# Patient Record
Sex: MALE | Race: AMERICAN INDIAN OR ALASKA NATIVE | NOT HISPANIC OR LATINO | Employment: UNEMPLOYED | URBAN - METROPOLITAN AREA
[De-identification: names, ages, dates, MRNs, and addresses within clinical notes are randomized per-mention and may not be internally consistent; named-entity substitution may affect disease eponyms.]

---

## 2024-08-29 ENCOUNTER — HOSPITAL ENCOUNTER (OUTPATIENT)
Facility: HOSPITAL | Age: 19
Setting detail: OBSERVATION
Discharge: HOME | End: 2024-08-30
Attending: EMERGENCY MEDICINE | Admitting: EMERGENCY MEDICINE

## 2024-08-29 DIAGNOSIS — R00.2 PALPITATIONS: ICD-10-CM

## 2024-08-29 DIAGNOSIS — R07.9 CHEST PAIN IN ADULT: Primary | ICD-10-CM

## 2024-08-29 PROCEDURE — 93005 ELECTROCARDIOGRAM TRACING: CPT | Performed by: EMERGENCY MEDICINE

## 2024-08-29 PROCEDURE — 93005 ELECTROCARDIOGRAM TRACING: CPT

## 2024-08-29 PROCEDURE — 99285 EMERGENCY DEPT VISIT HI MDM: CPT

## 2024-08-30 ENCOUNTER — APPOINTMENT (OUTPATIENT)
Dept: CARDIOLOGY | Facility: HOSPITAL | Age: 19
End: 2024-08-30

## 2024-08-30 ENCOUNTER — APPOINTMENT (OUTPATIENT)
Dept: GENERAL RADIOLOGY | Facility: HOSPITAL | Age: 19
End: 2024-08-30

## 2024-08-30 VITALS
BODY MASS INDEX: 24.64 KG/M2 | WEIGHT: 176 LBS | HEIGHT: 71 IN | HEART RATE: 47 BPM | OXYGEN SATURATION: 99 % | SYSTOLIC BLOOD PRESSURE: 129 MMHG | DIASTOLIC BLOOD PRESSURE: 80 MMHG | RESPIRATION RATE: 16 BRPM | TEMPERATURE: 97.8 F

## 2024-08-30 PROBLEM — R00.2 PALPITATIONS: Status: ACTIVE | Noted: 2024-08-30

## 2024-08-30 LAB
ALBUMIN SERPL-MCNC: 4.6 G/DL (ref 3.5–5.2)
ALBUMIN/GLOB SERPL: 1.7 G/DL
ALP SERPL-CCNC: 76 U/L (ref 39–117)
ALT SERPL W P-5'-P-CCNC: 13 U/L (ref 1–41)
AMPHET+METHAMPHET UR QL: NEGATIVE
ANION GAP SERPL CALCULATED.3IONS-SCNC: 11.8 MMOL/L (ref 5–15)
ANION GAP SERPL CALCULATED.3IONS-SCNC: 7.9 MMOL/L (ref 5–15)
AORTIC ARCH: 2.6 CM
APTT PPP: 29.7 SECONDS (ref 22.7–35.4)
ASCENDING AORTA: 2.6 CM
AST SERPL-CCNC: 20 U/L (ref 1–40)
BARBITURATES UR QL SCN: NEGATIVE
BASOPHILS # BLD AUTO: 0.02 10*3/MM3 (ref 0–0.2)
BASOPHILS NFR BLD AUTO: 0.2 % (ref 0–1.5)
BENZODIAZ UR QL SCN: NEGATIVE
BH CV ECHO MEAS - ACS: 2.25 CM
BH CV ECHO MEAS - AO MAX PG: 5.3 MMHG
BH CV ECHO MEAS - AO MEAN PG: 2.6 MMHG
BH CV ECHO MEAS - AO ROOT DIAM: 2.9 CM
BH CV ECHO MEAS - AO V2 MAX: 114.7 CM/SEC
BH CV ECHO MEAS - AO V2 VTI: 23.2 CM
BH CV ECHO MEAS - AVA(I,D): 2.9 CM2
BH CV ECHO MEAS - EDV(CUBED): 126.5 ML
BH CV ECHO MEAS - EDV(MOD-SP2): 123 ML
BH CV ECHO MEAS - EDV(MOD-SP4): 100 ML
BH CV ECHO MEAS - EF(MOD-BP): 52.9 %
BH CV ECHO MEAS - EF(MOD-SP2): 56.1 %
BH CV ECHO MEAS - EF(MOD-SP4): 51 %
BH CV ECHO MEAS - ESV(CUBED): 46.5 ML
BH CV ECHO MEAS - ESV(MOD-SP2): 54 ML
BH CV ECHO MEAS - ESV(MOD-SP4): 49 ML
BH CV ECHO MEAS - FS: 28.4 %
BH CV ECHO MEAS - IVS/LVPW: 1.05 CM
BH CV ECHO MEAS - IVSD: 0.65 CM
BH CV ECHO MEAS - LAT PEAK E' VEL: 20.9 CM/SEC
BH CV ECHO MEAS - LV DIASTOLIC VOL/BSA (35-75): 50.1 CM2
BH CV ECHO MEAS - LV MASS(C)D: 102.7 GRAMS
BH CV ECHO MEAS - LV MAX PG: 3.3 MMHG
BH CV ECHO MEAS - LV MEAN PG: 1.53 MMHG
BH CV ECHO MEAS - LV SYSTOLIC VOL/BSA (12-30): 24.5 CM2
BH CV ECHO MEAS - LV V1 MAX: 90.7 CM/SEC
BH CV ECHO MEAS - LV V1 VTI: 18.6 CM
BH CV ECHO MEAS - LVIDD: 5 CM
BH CV ECHO MEAS - LVIDS: 3.6 CM
BH CV ECHO MEAS - LVOT AREA: 3.6 CM2
BH CV ECHO MEAS - LVOT DIAM: 2.15 CM
BH CV ECHO MEAS - LVPWD: 0.62 CM
BH CV ECHO MEAS - MED PEAK E' VEL: 13.2 CM/SEC
BH CV ECHO MEAS - MV A DUR: 0.14 SEC
BH CV ECHO MEAS - MV A MAX VEL: 40.5 CM/SEC
BH CV ECHO MEAS - MV DEC SLOPE: 373.3 CM/SEC2
BH CV ECHO MEAS - MV DEC TIME: 0.19 SEC
BH CV ECHO MEAS - MV E MAX VEL: 81.5 CM/SEC
BH CV ECHO MEAS - MV E/A: 2.01
BH CV ECHO MEAS - MV MAX PG: 4 MMHG
BH CV ECHO MEAS - MV MEAN PG: 0.73 MMHG
BH CV ECHO MEAS - MV P1/2T: 73 MSEC
BH CV ECHO MEAS - MV V2 VTI: 34.1 CM
BH CV ECHO MEAS - MVA(P1/2T): 3 CM2
BH CV ECHO MEAS - MVA(VTI): 1.97 CM2
BH CV ECHO MEAS - PA ACC TIME: 0.15 SEC
BH CV ECHO MEAS - PA V2 MAX: 78.8 CM/SEC
BH CV ECHO MEAS - PULM A REVS DUR: 0.13 SEC
BH CV ECHO MEAS - PULM A REVS VEL: 34.1 CM/SEC
BH CV ECHO MEAS - PULM DIAS VEL: 37.9 CM/SEC
BH CV ECHO MEAS - PULM S/D: 1.06
BH CV ECHO MEAS - PULM SYS VEL: 40.3 CM/SEC
BH CV ECHO MEAS - QP/QS: 0.83
BH CV ECHO MEAS - RAP SYSTOLE: 3 MMHG
BH CV ECHO MEAS - RV MAX PG: 1.35 MMHG
BH CV ECHO MEAS - RV V1 MAX: 58.2 CM/SEC
BH CV ECHO MEAS - RV V1 VTI: 15.8 CM
BH CV ECHO MEAS - RVOT DIAM: 2.12 CM
BH CV ECHO MEAS - RVSP: 22 MMHG
BH CV ECHO MEAS - SV(LVOT): 67.3 ML
BH CV ECHO MEAS - SV(MOD-SP2): 69 ML
BH CV ECHO MEAS - SV(MOD-SP4): 51 ML
BH CV ECHO MEAS - SV(RVOT): 55.7 ML
BH CV ECHO MEAS - SVI(LVOT): 33.7 ML/M2
BH CV ECHO MEAS - SVI(MOD-SP2): 34.5 ML/M2
BH CV ECHO MEAS - SVI(MOD-SP4): 25.5 ML/M2
BH CV ECHO MEAS - TAPSE (>1.6): 2.35 CM
BH CV ECHO MEAS - TR MAX PG: 19.4 MMHG
BH CV ECHO MEAS - TR MAX VEL: 220 CM/SEC
BH CV ECHO MEASUREMENTS AVERAGE E/E' RATIO: 4.78
BH CV STRESS BP STAGE 1: NORMAL
BH CV STRESS BP STAGE 2: NORMAL
BH CV STRESS BP STAGE 3: NORMAL
BH CV STRESS BP STAGE 4: NORMAL
BH CV STRESS DURATION MIN STAGE 1: 3
BH CV STRESS DURATION MIN STAGE 2: 3
BH CV STRESS DURATION MIN STAGE 3: 3
BH CV STRESS DURATION MIN STAGE 4: 3
BH CV STRESS DURATION SEC STAGE 1: 0
BH CV STRESS DURATION SEC STAGE 2: 0
BH CV STRESS DURATION SEC STAGE 3: 0
BH CV STRESS DURATION SEC STAGE 4: 0
BH CV STRESS GRADE STAGE 1: 10
BH CV STRESS GRADE STAGE 2: 12
BH CV STRESS GRADE STAGE 3: 14
BH CV STRESS GRADE STAGE 4: 16
BH CV STRESS HR STAGE 1: 110
BH CV STRESS HR STAGE 2: 126
BH CV STRESS HR STAGE 3: 150
BH CV STRESS HR STAGE 4: 174
BH CV STRESS METS STAGE 1: 5
BH CV STRESS METS STAGE 2: 7.5
BH CV STRESS METS STAGE 3: 10
BH CV STRESS METS STAGE 4: 13.5
BH CV STRESS PROTOCOL 1: NORMAL
BH CV STRESS RECOVERY BP: NORMAL MMHG
BH CV STRESS RECOVERY HR: 97 BPM
BH CV STRESS SPEED STAGE 1: 1.7
BH CV STRESS SPEED STAGE 2: 2.5
BH CV STRESS SPEED STAGE 3: 3.4
BH CV STRESS SPEED STAGE 4: 4.2
BH CV STRESS STAGE 1: 1
BH CV STRESS STAGE 2: 2
BH CV STRESS STAGE 3: 3
BH CV STRESS STAGE 4: 4
BH CV XLRA - RV BASE: 3.7 CM
BH CV XLRA - RV LENGTH: 8.3 CM
BH CV XLRA - RV MID: 2.8 CM
BH CV XLRA - TDI S': 12.8 CM/SEC
BILIRUB SERPL-MCNC: 0.5 MG/DL (ref 0–1.2)
BUN SERPL-MCNC: 15 MG/DL (ref 6–20)
BUN SERPL-MCNC: 17 MG/DL (ref 6–20)
BUN/CREAT SERPL: 13.4 (ref 7–25)
BUN/CREAT SERPL: 13.9 (ref 7–25)
CALCIUM SPEC-SCNC: 8.5 MG/DL (ref 8.6–10.5)
CALCIUM SPEC-SCNC: 9.3 MG/DL (ref 8.6–10.5)
CANNABINOIDS SERPL QL: NEGATIVE
CHLORIDE SERPL-SCNC: 106 MMOL/L (ref 98–107)
CHLORIDE SERPL-SCNC: 109 MMOL/L (ref 98–107)
CK SERPL-CCNC: 215 U/L (ref 20–200)
CO2 SERPL-SCNC: 23.1 MMOL/L (ref 22–29)
CO2 SERPL-SCNC: 23.2 MMOL/L (ref 22–29)
COCAINE UR QL: NEGATIVE
CREAT SERPL-MCNC: 1.08 MG/DL (ref 0.76–1.27)
CREAT SERPL-MCNC: 1.27 MG/DL (ref 0.76–1.27)
D DIMER PPP FEU-MCNC: <0.27 MCGFEU/ML (ref 0–0.5)
DEPRECATED RDW RBC AUTO: 41.4 FL (ref 37–54)
DEPRECATED RDW RBC AUTO: 43 FL (ref 37–54)
EGFRCR SERPLBLD CKD-EPI 2021: 100.8 ML/MIN/1.73
EGFRCR SERPLBLD CKD-EPI 2021: 82.9 ML/MIN/1.73
EOSINOPHIL # BLD AUTO: 0.02 10*3/MM3 (ref 0–0.4)
EOSINOPHIL NFR BLD AUTO: 0.2 % (ref 0.3–6.2)
ERYTHROCYTE [DISTWIDTH] IN BLOOD BY AUTOMATED COUNT: 12.2 % (ref 12.3–15.4)
ERYTHROCYTE [DISTWIDTH] IN BLOOD BY AUTOMATED COUNT: 12.6 % (ref 12.3–15.4)
ETHANOL BLD-MCNC: <10 MG/DL (ref 0–10)
ETHANOL UR QL: <0.01 %
FENTANYL UR-MCNC: NEGATIVE NG/ML
GEN 5 2HR TROPONIN T REFLEX: 15 NG/L
GLOBULIN UR ELPH-MCNC: 2.7 GM/DL
GLUCOSE SERPL-MCNC: 111 MG/DL (ref 65–99)
GLUCOSE SERPL-MCNC: 90 MG/DL (ref 65–99)
HCT VFR BLD AUTO: 39.5 % (ref 37.5–51)
HCT VFR BLD AUTO: 41.9 % (ref 37.5–51)
HGB BLD-MCNC: 13.5 G/DL (ref 13–17.7)
HGB BLD-MCNC: 14.2 G/DL (ref 13–17.7)
IMM GRANULOCYTES # BLD AUTO: 0.03 10*3/MM3 (ref 0–0.05)
IMM GRANULOCYTES NFR BLD AUTO: 0.3 % (ref 0–0.5)
INR PPP: 1.17 (ref 0.9–1.1)
LEFT ATRIUM VOLUME INDEX: 21 ML/M2
LYMPHOCYTES # BLD AUTO: 2 10*3/MM3 (ref 0.7–3.1)
LYMPHOCYTES # BLD MANUAL: 2.33 10*3/MM3 (ref 0.7–3.1)
LYMPHOCYTES NFR BLD AUTO: 22.5 % (ref 19.6–45.3)
LYMPHOCYTES NFR BLD MANUAL: 9 % (ref 5–12)
MAGNESIUM SERPL-MCNC: 2.3 MG/DL (ref 1.7–2.2)
MAXIMAL PREDICTED HEART RATE: 200 BPM
MCH RBC QN AUTO: 31.5 PG (ref 26.6–33)
MCH RBC QN AUTO: 32.2 PG (ref 26.6–33)
MCHC RBC AUTO-ENTMCNC: 33.9 G/DL (ref 31.5–35.7)
MCHC RBC AUTO-ENTMCNC: 34.2 G/DL (ref 31.5–35.7)
MCV RBC AUTO: 92.9 FL (ref 79–97)
MCV RBC AUTO: 94.3 FL (ref 79–97)
METHADONE UR QL SCN: NEGATIVE
MONOCYTES # BLD AUTO: 0.63 10*3/MM3 (ref 0.1–0.9)
MONOCYTES # BLD: 0.49 10*3/MM3 (ref 0.1–0.9)
MONOCYTES NFR BLD AUTO: 7.1 % (ref 5–12)
NEUTROPHILS # BLD AUTO: 2.6 10*3/MM3 (ref 1.7–7)
NEUTROPHILS NFR BLD AUTO: 6.19 10*3/MM3 (ref 1.7–7)
NEUTROPHILS NFR BLD AUTO: 69.7 % (ref 42.7–76)
NEUTROPHILS NFR BLD MANUAL: 48 % (ref 42.7–76)
NRBC BLD AUTO-RTO: 0 /100 WBC (ref 0–0.2)
OPIATES UR QL: NEGATIVE
OXYCODONE UR QL SCN: NEGATIVE
PERCENT MAX PREDICTED HR: 87 %
PLAT MORPH BLD: NORMAL
PLATELET # BLD AUTO: 173 10*3/MM3 (ref 140–450)
PLATELET # BLD AUTO: 224 10*3/MM3 (ref 140–450)
PMV BLD AUTO: 10.2 FL (ref 6–12)
PMV BLD AUTO: 10.2 FL (ref 6–12)
POTASSIUM SERPL-SCNC: 3.2 MMOL/L (ref 3.5–5.2)
POTASSIUM SERPL-SCNC: 3.5 MMOL/L (ref 3.5–5.2)
PROT SERPL-MCNC: 7.3 G/DL (ref 6–8.5)
PROTHROMBIN TIME: 15.1 SECONDS (ref 11.7–14.2)
QT INTERVAL: 374 MS
QT INTERVAL: 452 MS
QTC INTERVAL: 443 MS
QTC INTERVAL: 445 MS
RBC # BLD AUTO: 4.19 10*6/MM3 (ref 4.14–5.8)
RBC # BLD AUTO: 4.51 10*6/MM3 (ref 4.14–5.8)
RBC MORPH BLD: NORMAL
SINUS: 2.7 CM
SODIUM SERPL-SCNC: 140 MMOL/L (ref 136–145)
SODIUM SERPL-SCNC: 141 MMOL/L (ref 136–145)
STJ: 2.7 CM
STRESS BASELINE BP: NORMAL MMHG
STRESS BASELINE HR: 97 BPM
STRESS PERCENT HR: 102 %
STRESS POST ESTIMATED WORKLOAD: 13.5 METS
STRESS POST EXERCISE DUR MIN: 12 MIN
STRESS POST EXERCISE DUR SEC: 0 SEC
STRESS POST PEAK BP: NORMAL MMHG
STRESS POST PEAK HR: 174 BPM
STRESS TARGET HR: 170 BPM
TROPONIN T DELTA: 2 NG/L
TROPONIN T SERPL HS-MCNC: 13 NG/L
TROPONIN T SERPL HS-MCNC: 14 NG/L
TSH SERPL DL<=0.05 MIU/L-ACNC: 1.96 UIU/ML (ref 0.27–4.2)
VARIANT LYMPHS NFR BLD MANUAL: 43 % (ref 19.6–45.3)
WBC MORPH BLD: NORMAL
WBC NRBC COR # BLD AUTO: 5.42 10*3/MM3 (ref 3.4–10.8)
WBC NRBC COR # BLD AUTO: 8.89 10*3/MM3 (ref 3.4–10.8)

## 2024-08-30 PROCEDURE — 96374 THER/PROPH/DIAG INJ IV PUSH: CPT

## 2024-08-30 PROCEDURE — 80053 COMPREHEN METABOLIC PANEL: CPT | Performed by: EMERGENCY MEDICINE

## 2024-08-30 PROCEDURE — G0378 HOSPITAL OBSERVATION PER HR: HCPCS

## 2024-08-30 PROCEDURE — 93016 CV STRESS TEST SUPVJ ONLY: CPT | Performed by: STUDENT IN AN ORGANIZED HEALTH CARE EDUCATION/TRAINING PROGRAM

## 2024-08-30 PROCEDURE — 80307 DRUG TEST PRSMV CHEM ANLYZR: CPT | Performed by: EMERGENCY MEDICINE

## 2024-08-30 PROCEDURE — 84484 ASSAY OF TROPONIN QUANT: CPT | Performed by: EMERGENCY MEDICINE

## 2024-08-30 PROCEDURE — 93005 ELECTROCARDIOGRAM TRACING: CPT | Performed by: PHYSICIAN ASSISTANT

## 2024-08-30 PROCEDURE — 93017 CV STRESS TEST TRACING ONLY: CPT

## 2024-08-30 PROCEDURE — 93018 CV STRESS TEST I&R ONLY: CPT | Performed by: STUDENT IN AN ORGANIZED HEALTH CARE EDUCATION/TRAINING PROGRAM

## 2024-08-30 PROCEDURE — 93010 ELECTROCARDIOGRAM REPORT: CPT | Performed by: INTERNAL MEDICINE

## 2024-08-30 PROCEDURE — 82077 ASSAY SPEC XCP UR&BREATH IA: CPT | Performed by: PHYSICIAN ASSISTANT

## 2024-08-30 PROCEDURE — 85730 THROMBOPLASTIN TIME PARTIAL: CPT | Performed by: EMERGENCY MEDICINE

## 2024-08-30 PROCEDURE — 85025 COMPLETE CBC W/AUTO DIFF WBC: CPT | Performed by: EMERGENCY MEDICINE

## 2024-08-30 PROCEDURE — 85027 COMPLETE CBC AUTOMATED: CPT | Performed by: PHYSICIAN ASSISTANT

## 2024-08-30 PROCEDURE — 99204 OFFICE O/P NEW MOD 45 MIN: CPT | Performed by: INTERNAL MEDICINE

## 2024-08-30 PROCEDURE — 82550 ASSAY OF CK (CPK): CPT | Performed by: EMERGENCY MEDICINE

## 2024-08-30 PROCEDURE — 83735 ASSAY OF MAGNESIUM: CPT | Performed by: EMERGENCY MEDICINE

## 2024-08-30 PROCEDURE — 25810000003 SODIUM CHLORIDE 0.9 % SOLUTION: Performed by: EMERGENCY MEDICINE

## 2024-08-30 PROCEDURE — 85379 FIBRIN DEGRADATION QUANT: CPT | Performed by: EMERGENCY MEDICINE

## 2024-08-30 PROCEDURE — 85610 PROTHROMBIN TIME: CPT | Performed by: EMERGENCY MEDICINE

## 2024-08-30 PROCEDURE — 96375 TX/PRO/DX INJ NEW DRUG ADDON: CPT

## 2024-08-30 PROCEDURE — 25010000002 KETOROLAC TROMETHAMINE PER 15 MG: Performed by: EMERGENCY MEDICINE

## 2024-08-30 PROCEDURE — 71045 X-RAY EXAM CHEST 1 VIEW: CPT

## 2024-08-30 PROCEDURE — 84443 ASSAY THYROID STIM HORMONE: CPT | Performed by: EMERGENCY MEDICINE

## 2024-08-30 PROCEDURE — 84484 ASSAY OF TROPONIN QUANT: CPT | Performed by: PHYSICIAN ASSISTANT

## 2024-08-30 PROCEDURE — 93306 TTE W/DOPPLER COMPLETE: CPT

## 2024-08-30 PROCEDURE — 93246 EXT ECG>7D<15D RECORDING: CPT

## 2024-08-30 PROCEDURE — 93306 TTE W/DOPPLER COMPLETE: CPT | Performed by: INTERNAL MEDICINE

## 2024-08-30 PROCEDURE — 36415 COLL VENOUS BLD VENIPUNCTURE: CPT

## 2024-08-30 RX ORDER — BISACODYL 10 MG
10 SUPPOSITORY, RECTAL RECTAL DAILY PRN
Status: DISCONTINUED | OUTPATIENT
Start: 2024-08-30 | End: 2024-08-30 | Stop reason: HOSPADM

## 2024-08-30 RX ORDER — ONDANSETRON 4 MG/1
4 TABLET, ORALLY DISINTEGRATING ORAL EVERY 6 HOURS PRN
Status: DISCONTINUED | OUTPATIENT
Start: 2024-08-30 | End: 2024-08-30 | Stop reason: HOSPADM

## 2024-08-30 RX ORDER — SODIUM CHLORIDE 0.9 % (FLUSH) 0.9 %
10 SYRINGE (ML) INJECTION AS NEEDED
Status: DISCONTINUED | OUTPATIENT
Start: 2024-08-30 | End: 2024-08-30 | Stop reason: HOSPADM

## 2024-08-30 RX ORDER — ASPIRIN 81 MG/1
324 TABLET, CHEWABLE ORAL ONCE
Status: COMPLETED | OUTPATIENT
Start: 2024-08-30 | End: 2024-08-30

## 2024-08-30 RX ORDER — POTASSIUM CHLORIDE 750 MG/1
40 TABLET, FILM COATED, EXTENDED RELEASE ORAL EVERY 4 HOURS
Status: DISCONTINUED | OUTPATIENT
Start: 2024-08-30 | End: 2024-08-30 | Stop reason: HOSPADM

## 2024-08-30 RX ORDER — KETOROLAC TROMETHAMINE 15 MG/ML
15 INJECTION, SOLUTION INTRAMUSCULAR; INTRAVENOUS ONCE
Status: COMPLETED | OUTPATIENT
Start: 2024-08-30 | End: 2024-08-30

## 2024-08-30 RX ORDER — NITROGLYCERIN 0.4 MG/1
0.4 TABLET SUBLINGUAL
Status: DISCONTINUED | OUTPATIENT
Start: 2024-08-30 | End: 2024-08-30 | Stop reason: HOSPADM

## 2024-08-30 RX ORDER — ALUMINA, MAGNESIA, AND SIMETHICONE 2400; 2400; 240 MG/30ML; MG/30ML; MG/30ML
30 SUSPENSION ORAL ONCE
Status: COMPLETED | OUTPATIENT
Start: 2024-08-30 | End: 2024-08-30

## 2024-08-30 RX ORDER — BISACODYL 5 MG/1
5 TABLET, DELAYED RELEASE ORAL DAILY PRN
Status: DISCONTINUED | OUTPATIENT
Start: 2024-08-30 | End: 2024-08-30 | Stop reason: HOSPADM

## 2024-08-30 RX ORDER — LIDOCAINE HYDROCHLORIDE 20 MG/ML
15 SOLUTION OROPHARYNGEAL ONCE
Status: COMPLETED | OUTPATIENT
Start: 2024-08-30 | End: 2024-08-30

## 2024-08-30 RX ORDER — POLYETHYLENE GLYCOL 3350 17 G/17G
17 POWDER, FOR SOLUTION ORAL DAILY PRN
Status: DISCONTINUED | OUTPATIENT
Start: 2024-08-30 | End: 2024-08-30 | Stop reason: HOSPADM

## 2024-08-30 RX ORDER — SODIUM CHLORIDE 9 MG/ML
40 INJECTION, SOLUTION INTRAVENOUS AS NEEDED
Status: DISCONTINUED | OUTPATIENT
Start: 2024-08-30 | End: 2024-08-30 | Stop reason: HOSPADM

## 2024-08-30 RX ORDER — AMOXICILLIN 250 MG
2 CAPSULE ORAL 2 TIMES DAILY PRN
Status: DISCONTINUED | OUTPATIENT
Start: 2024-08-30 | End: 2024-08-30 | Stop reason: HOSPADM

## 2024-08-30 RX ORDER — SODIUM CHLORIDE 9 MG/ML
125 INJECTION, SOLUTION INTRAVENOUS CONTINUOUS
Status: DISCONTINUED | OUTPATIENT
Start: 2024-08-30 | End: 2024-08-30 | Stop reason: HOSPADM

## 2024-08-30 RX ORDER — FAMOTIDINE 10 MG/ML
20 INJECTION, SOLUTION INTRAVENOUS ONCE
Status: COMPLETED | OUTPATIENT
Start: 2024-08-30 | End: 2024-08-30

## 2024-08-30 RX ORDER — SODIUM CHLORIDE 0.9 % (FLUSH) 0.9 %
10 SYRINGE (ML) INJECTION EVERY 12 HOURS SCHEDULED
Status: DISCONTINUED | OUTPATIENT
Start: 2024-08-30 | End: 2024-08-30 | Stop reason: HOSPADM

## 2024-08-30 RX ORDER — ONDANSETRON 2 MG/ML
4 INJECTION INTRAMUSCULAR; INTRAVENOUS EVERY 6 HOURS PRN
Status: DISCONTINUED | OUTPATIENT
Start: 2024-08-30 | End: 2024-08-30 | Stop reason: HOSPADM

## 2024-08-30 RX ORDER — ACETAMINOPHEN 325 MG/1
650 TABLET ORAL EVERY 4 HOURS PRN
Status: DISCONTINUED | OUTPATIENT
Start: 2024-08-30 | End: 2024-08-30 | Stop reason: HOSPADM

## 2024-08-30 RX ADMIN — ASPIRIN 324 MG: 81 TABLET, CHEWABLE ORAL at 00:39

## 2024-08-30 RX ADMIN — Medication 10 ML: at 09:59

## 2024-08-30 RX ADMIN — ALUMINUM HYDROXIDE, MAGNESIUM HYDROXIDE, DIMETHICONE 30 ML: 400; 400; 40 SUSPENSION ORAL at 00:41

## 2024-08-30 RX ADMIN — LIDOCAINE HYDROCHLORIDE 15 ML: 20 SOLUTION ORAL at 00:41

## 2024-08-30 RX ADMIN — SODIUM CHLORIDE 500 ML: 9 INJECTION, SOLUTION INTRAVENOUS at 00:39

## 2024-08-30 RX ADMIN — POTASSIUM CHLORIDE 40 MEQ: 750 TABLET, EXTENDED RELEASE ORAL at 09:59

## 2024-08-30 RX ADMIN — FAMOTIDINE 20 MG: 10 INJECTION INTRAVENOUS at 00:41

## 2024-08-30 RX ADMIN — SODIUM CHLORIDE 125 ML/HR: 9 INJECTION, SOLUTION INTRAVENOUS at 01:25

## 2024-08-30 RX ADMIN — KETOROLAC TROMETHAMINE 15 MG: 15 INJECTION, SOLUTION INTRAMUSCULAR; INTRAVENOUS at 01:23

## 2024-08-30 NOTE — H&P
[facility]   HISTORY AND PHYSICAL    Patient Name: Andres Andrade  : 2005  MRN: [id number]  Primary Care Physician:  Cynthia, No Known  Date of admission: 2024    Subjective   Subjective     Chief Complaint:   Chief Complaint   Patient presents with    Chest Pain         HPI:    Andres Andrade is a 19 y.o. male who comes in complaining of chest palpitations and chest discomfort for the past several months.  Patient states his symptoms progressively got worse over the past few weeks.  Patient describes his pain as feeling aches in the center of the chest while his heart is racing.  Patient states he will often feel lightheaded and feel nauseous along with this.  Patient denies any vomiting.  Patient believes he did have an episode where his vision went black but he states that he did not lose consciousness or suffer any falls or injuries and denies hitting his head.  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  Patient states he has not had any significant falls or injuries due to this.  Patient denies any increased or worsening anxiety or stress.  Patient does report some tingling in his hands that will come and go along with his symptoms as well.  Patient states he is pain-free currently.  Patient states he wears a watch double monitor his heart rate and states that he was working out light activity recently when his phone monitor had sensed his heart rate between 170 and 210 even after resting for several minutes.  Patient states that he will have the above symptoms of chest discomfort and lightheadedness when his heart rate remains high like this.  Patient states he had seen his primary care provider previously but only had lab work that was reportedly normal.  Patient states about 2 months ago he did have a bronchitis like illness in which patient states he had a chest x-ray and was put on antibiotics.  Patient denies any known heart history  or any previous heart testing back home in [place].    In the ED, CK of 215, troponin of 14, potassium of 3.2 otherwise unremarkable CBC and CMP for acute findings.  TSH normal.  D-dimer negative.  Chest x-ray shows trace bilateral pleural fluid, otherwise no acute findings.  EKG shows sinus rhythm 84 beats minute, early repolarization pattern versus IVCD versus pan ST elevation.  Patient is afebrile, pulse in 70s, on room air oxygen 95% SpO2 and blood pressure normotensive.    Review of Systems   All systems were reviewed and negative except for: as per HPI    Personal History     History reviewed. No pertinent past medical history.    Past Surgical History:   Procedure Laterality Date    RHINOPLASTY         Family History: family history is not on file. Otherwise pertinent FHx was reviewed and not pertinent to current issue.    Social History:  reports that he has never smoked. He does not have any smokeless tobacco history on file. He reports that he does not drink alcohol and does not use drugs.    Home Medications:       Allergies:  Allergies   Allergen Reactions    Amoxicillin Hives       Objective   Objective     Vitals:   Temp:  [99.9 °F (37.7 °C)] 99.9 °F (37.7 °C)  Heart Rate:  [70-92] 70  Resp:  [18] 18  BP: (107-137)/(60-84) 118/68  Physical Exam    Constitutional: Awake, alert   Eyes: PERRLA, sclerae anicteric, no conjunctival injection   HENT: NCAT, mucous membranes moist   Neck: Supple, no thyromegaly, no lymphadenopathy, trachea midline   Respiratory: Clear to auscultation bilaterally, nonlabored respirations    Cardiovascular: RRR, no murmurs, rubs, or gallops, palpable pedal pulses bilaterally   Gastrointestinal: Positive bowel sounds, soft, nontender, nondistended   Musculoskeletal: No bilateral ankle edema, no clubbing or cyanosis to extremities   Psychiatric: Appropriate affect, cooperative   Neurologic: Oriented x 3, strength symmetric in all extremities, Cranial Nerves grossly intact to  confrontation, speech clear   Skin: No rashes     Result Review    Result Review:  I have personally reviewed the results from the time of this admission to 8/30/2024 04:43 EDT and agree with these findings:  [x]  Laboratory list / accordion  []  Microbiology  [x]  Radiology  [x]  EKG/Telemetry   []  Cardiology/Vascular   []  Pathology  []  Old records  []  Other:  Most notable findings include: see above      Assessment & Plan   Assessment / Plan     Brief Patient Summary:  Andres Andrade is a 19 y.o. male who comes in complaining of chest pain    Active Hospital Problems:  Active Hospital Problems    Diagnosis     **Palpitations      Plan:     Chest pain, palpitations  - CK of 215,   -troponin of 14,   -TSH normal.   - D-dimer neg  -Chest x-ray shows trace bilateral pleural fluid, otherwise no acute findings.    -EKG shows sinus rhythm 84 beats minute, early repolarization pattern versus IVCD versus pan ST elevation.  -Patient given full dose aspirin, GI cocktail including viscous lidocaine, Maalox, Pepcid and 500 normal saline bolus.  -Cardiology consult  -Check echo, UDS, EtOH, orthostatics  -Repeat EKG, troponin   -IV fluids 125 mm/h normal saline  -Continuous cardiac monitoring  -N.p.o.    Hypokalemia, replace  -potassium of 3.2 otherwise unremarkable CBC and CMP for acute findings.      GERD  -PPI      VTE Prophylaxis:  Mechanical VTE prophylaxis orders are present.        CODE STATUS:    Code Status (Patient has no pulse and is not breathing): CPR (Attempt to Resuscitate)  Medical Interventions (Patient has pulse or is breathing): Full Support    Admission Status:  I believe this patient meets observation status.    77 minutes have been spent by [facility] providers in the care of this patient while under observation status.      Appropriate PPE worn during patient encounter.  Hand hygeine performed before and after seeing the patient.      Electronically signed by JUAN MANUEL Barth,  08/30/24, 1:18 AM EDT.

## 2024-08-30 NOTE — ED PROVIDER NOTES
EMERGENCY DEPARTMENT ENCOUNTER  Room Number:  24/24  PCP: Provider, No Known  Independent Historians: Patient      HPI:  Chief Complaint: Chest pain and tachycardia    A complete HPI/ROS/PMH/PSH/SH/FH are unobtainable due to: None    Chronic or social conditions impacting patient care (Social Determinants of Health): None      Context: Andres Andrade is a 19 y.o. male with a medical history of dyspepsia who presents to the ED c/o acute chest pain.  Patient complains of parasternal chest pain that feels like burning sensation and is worse with heartbeat.  Patient reports that over the last several weeks he has noticed palpitations.  The patient is a  and rides [employer].  He is visiting [place] from [facility] for a local event.  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  He avidin after stopping to rest his heart rate remained well above 150 for several hours.  He reports it feels like his head is going to explode.  He is very short of breath with the events and feels fatigued during and after.  He reports increased fatigue with these episodes over the last few weeks.  He was seen by his primary care provider who referred him to cardiology I drew some blood for laboratory evaluation though he has not seen cardiology yet and is not aware of what the results are for his blood work.  Still having some discomfort at this time that began earlier in the day.  Occasional cough.  Dyspepsia today is unchanged from previous.  No reported fevers, lower extremity swelling, prior blood clots.  No first-degree relatives with early onset CAD.      Review of prior external notes (non-ED) -and- Review of prior external test results outside of this encounter:        PAST MEDICAL HISTORY  Active Ambulatory Problems     Diagnosis Date Noted    No Active Ambulatory Problems     Resolved Ambulatory Problems     Diagnosis Date Noted    No Resolved Ambulatory Problems     No  Additional Past Medical History         PAST SURGICAL HISTORY  Past Surgical History:   Procedure Laterality Date    RHINOPLASTY           FAMILY HISTORY  History reviewed. No pertinent family history.      SOCIAL HISTORY  Social History     Socioeconomic History    Marital status: Single   Tobacco Use    Smoking status: Never   Vaping Use    Vaping status: Never Used   Substance and Sexual Activity    Alcohol use: Never    Drug use: Never         ALLERGIES  Amoxicillin      REVIEW OF SYSTEMS  Review of Systems  Included in HPI  All systems reviewed and negative except for those discussed in HPI.      PHYSICAL EXAM    I have reviewed the triage vital signs and nursing notes.    ED Triage Vitals   Temp Heart Rate Resp BP SpO2   08/29/24 2349 08/29/24 2349 08/29/24 2349 08/29/24 2351 08/29/24 2349   99.9 °F (37.7 °C) 92 18 137/75 98 %      Temp src Heart Rate Source Patient Position BP Location FiO2 (%)   08/29/24 2349 08/29/24 2349 08/29/24 2351 08/29/24 2351 --   Tympanic Monitor Sitting Right arm        Physical Exam    Physical Exam   Constitutional: No distress.  Nontoxic  HENT:  Head: Normocephalic and atraumatic.   Oropharynx: Mucous membranes are moist.   Eyes: . No scleral icterus. No conjunctival pallor.  Neck: Normal range of motion. Neck supple.   Cardiovascular: Pink warm and well perfused throughout.  Regular rate and rhythm.  No murmur or split heart sounds appreciated.  Pulmonary/Chest: No respiratory distress.  No tachypnea or increased work of breathing appreciated.    Abdominal: Soft. There is no tenderness. There is no rebound and no guarding.   Musculoskeletal: Moves all extremities equally.  No calf tenderness or swelling  Neurological: Alert and oriented.  No acute focal deficit appreciated.  Skin: Skin is pink, warm, and dry.   Psychiatric: Mood and affect normal.   Nursing note and vitals reviewed.             LAB RESULTS  Recent Results (from the past 24 hour(s))   ECG 12 Lead Chest Pain     Collection Time: 08/30/24 12:00 AM   Result Value Ref Range    QT Interval 374 ms    QTC Interval 443 ms   Comprehensive Metabolic Panel    Collection Time: 08/30/24 12:33 AM    Specimen: Blood   Result Value Ref Range    Glucose 111 (H) 65 - 99 mg/dL    BUN 17 6 - 20 mg/dL    Creatinine 1.27 0.76 - 1.27 mg/dL    Sodium 141 136 - 145 mmol/L    Potassium 3.2 (L) 3.5 - 5.2 mmol/L    Chloride 106 98 - 107 mmol/L    CO2 23.2 22.0 - 29.0 mmol/L    Calcium 9.3 8.6 - 10.5 mg/dL    Total Protein 7.3 6.0 - 8.5 g/dL    Albumin 4.6 3.5 - 5.2 g/dL    ALT (SGPT) 13 1 - 41 U/L    AST (SGOT) 20 1 - 40 U/L    Alkaline Phosphatase 76 39 - 117 U/L    Total Bilirubin 0.5 0.0 - 1.2 mg/dL    Globulin 2.7 gm/dL    A/G Ratio 1.7 g/dL    BUN/Creatinine Ratio 13.4 7.0 - 25.0    Anion Gap 11.8 5.0 - 15.0 mmol/L    eGFR 82.9 >60.0 mL/min/1.73   Protime-INR    Collection Time: 08/30/24 12:33 AM    Specimen: Blood   Result Value Ref Range    Protime 15.1 (H) 11.7 - 14.2 Seconds    INR 1.17 (H) 0.90 - 1.10   aPTT    Collection Time: 08/30/24 12:33 AM    Specimen: Blood   Result Value Ref Range    PTT 29.7 22.7 - 35.4 seconds   D-dimer, Quantitative    Collection Time: 08/30/24 12:33 AM    Specimen: Blood   Result Value Ref Range    D-Dimer, Quantitative <0.27 0.00 - 0.50 MCGFEU/mL   Single High Sensitivity Troponin T    Collection Time: 08/30/24 12:33 AM    Specimen: Blood   Result Value Ref Range    HS Troponin T 14 <22 ng/L   TSH    Collection Time: 08/30/24 12:33 AM    Specimen: Blood   Result Value Ref Range    TSH 1.960 0.270 - 4.200 uIU/mL   Magnesium    Collection Time: 08/30/24 12:33 AM    Specimen: Blood   Result Value Ref Range    Magnesium 2.3 (H) 1.7 - 2.2 mg/dL   CK    Collection Time: 08/30/24 12:33 AM    Specimen: Blood   Result Value Ref Range    Creatine Kinase 215 (H) 20 - 200 U/L   CBC Auto Differential    Collection Time: 08/30/24 12:33 AM    Specimen: Blood   Result Value Ref Range    WBC 8.89 3.40 - 10.80 10*3/mm3    RBC  4.51 4.14 - 5.80 10*6/mm3    Hemoglobin 14.2 13.0 - 17.7 g/dL    Hematocrit 41.9 37.5 - 51.0 %    MCV 92.9 79.0 - 97.0 fL    MCH 31.5 26.6 - 33.0 pg    MCHC 33.9 31.5 - 35.7 g/dL    RDW 12.2 (L) 12.3 - 15.4 %    RDW-SD 41.4 37.0 - 54.0 fl    MPV 10.2 6.0 - 12.0 fL    Platelets 224 140 - 450 10*3/mm3    Neutrophil % 69.7 42.7 - 76.0 %    Lymphocyte % 22.5 19.6 - 45.3 %    Monocyte % 7.1 5.0 - 12.0 %    Eosinophil % 0.2 (L) 0.3 - 6.2 %    Basophil % 0.2 0.0 - 1.5 %    Immature Grans % 0.3 0.0 - 0.5 %    Neutrophils, Absolute 6.19 1.70 - 7.00 10*3/mm3    Lymphocytes, Absolute 2.00 0.70 - 3.10 10*3/mm3    Monocytes, Absolute 0.63 0.10 - 0.90 10*3/mm3    Eosinophils, Absolute 0.02 0.00 - 0.40 10*3/mm3    Basophils, Absolute 0.02 0.00 - 0.20 10*3/mm3    Immature Grans, Absolute 0.03 0.00 - 0.05 10*3/mm3    nRBC 0.0 0.0 - 0.2 /100 WBC         RADIOLOGY  No Radiology Exams Resulted Within Past 24 Hours      MEDICATIONS GIVEN IN ER  Medications   sodium chloride 0.9 % flush 10 mL (has no administration in time range)   sodium chloride 0.9 % infusion (125 mL/hr Intravenous New Bag 8/30/24 0125)   nitroglycerin (NITROSTAT) SL tablet 0.4 mg (has no administration in time range)   sodium chloride 0.9 % flush 10 mL (has no administration in time range)   sodium chloride 0.9 % flush 10 mL (has no administration in time range)   sodium chloride 0.9 % infusion 40 mL (has no administration in time range)   acetaminophen (TYLENOL) tablet 650 mg (has no administration in time range)   sennosides-docusate (PERICOLACE) 8.6-50 MG per tablet 2 tablet (has no administration in time range)     And   polyethylene glycol (MIRALAX) packet 17 g (has no administration in time range)     And   bisacodyl (DULCOLAX) EC tablet 5 mg (has no administration in time range)     And   bisacodyl (DULCOLAX) suppository 10 mg (has no administration in time range)   Potassium Replacement - Follow Nurse / BPA Driven Protocol (has no administration in time  range)   Magnesium Standard Dose Replacement - Follow Nurse / BPA Driven Protocol (has no administration in time range)   Phosphorus Replacement - Follow Nurse / BPA Driven Protocol (has no administration in time range)   Calcium Replacement - Follow Nurse / BPA Driven Protocol (has no administration in time range)   ondansetron ODT (ZOFRAN-ODT) disintegrating tablet 4 mg (has no administration in time range)     Or   ondansetron (ZOFRAN) injection 4 mg (has no administration in time range)   melatonin tablet 5 mg (has no administration in time range)   sodium chloride 0.9 % bolus 500 mL (0 mL Intravenous Stopped 8/30/24 0109)   aspirin chewable tablet 324 mg (324 mg Oral Given 8/30/24 0039)   aluminum-magnesium hydroxide-simethicone (MAALOX MAX) 400-400-40 MG/5ML suspension 30 mL (30 mL Oral Given 8/30/24 0041)   Lidocaine Viscous HCl (XYLOCAINE) 2 % solution 15 mL (15 mL Mouth/Throat Given 8/30/24 0041)   famotidine (PEPCID) injection 20 mg (20 mg Intravenous Given 8/30/24 0041)   ketorolac (TORADOL) injection 15 mg (15 mg Intravenous Given 8/30/24 0123)         ORDERS PLACED DURING THIS VISIT:  Orders Placed This Encounter   Procedures    XR Chest 1 View    Comprehensive Metabolic Panel    Protime-INR    aPTT    D-dimer, Quantitative    Single High Sensitivity Troponin T    TSH    Magnesium    CK    CBC Auto Differential    Urine Drug Screen - Urine, Clean Catch    Basic Metabolic Panel    High Sensitivity Troponin T    Manual Differential    CBC Auto Differential    NPO Diet NPO Type: Strict NPO    Monitor Blood Pressure    Continuous Pulse Oximetry    Vital Signs    Telemetry - Place Orders & Notify Provider of Results When Patient Experiences Acute Chest Pain, Dysrhythmia or Respiratory Distress    May Be Off Telemetry for Tests    Notify Provider (With Default Parameters)    Activity - Ad Tiffany    Intake & Output    Weigh Patient    Oral Care    Saline Lock & Maintain IV Access    Place Sequential Compression  Device    Maintain Sequential Compression Device    Code Status and Medical Interventions: CPR (Attempt to Resuscitate); Full Support    Inpatient Cardiology Consult    ECG 12 Lead Chest Pain    ECG 12 Lead Chest Pain    Adult Transthoracic Echo Complete W/ Cont if Necessary Per Protocol    Insert Peripheral IV    Insert Peripheral IV    Initiate ED Observation Status    CBC & Differential    CBC & Differential         OUTPATIENT MEDICATION MANAGEMENT:  Current Facility-Administered Medications Ordered in Epic   Medication Dose Route Frequency Provider Last Rate Last Admin    acetaminophen (TYLENOL) tablet 650 mg  650 mg Oral Q4H PRN Yosvany Tamayo PA        sennosides-docusate (PERICOLACE) 8.6-50 MG per tablet 2 tablet  2 tablet Oral BID PRN Yosvany Tamayo PA        And    polyethylene glycol (MIRALAX) packet 17 g  17 g Oral Daily PRN Yosvany Tamayo PA        And    bisacodyl (DULCOLAX) EC tablet 5 mg  5 mg Oral Daily PRN Yosvany Tamayo PA        And    bisacodyl (DULCOLAX) suppository 10 mg  10 mg Rectal Daily PRN Yosvany Tamayo PA        Calcium Replacement - Follow Nurse / BPA Driven Protocol   Does not apply PRN Yosvany Tamayo PA        Magnesium Standard Dose Replacement - Follow Nurse / BPA Driven Protocol   Does not apply PRN Yosvany Tamayo PA        melatonin tablet 5 mg  5 mg Oral Nightly PRN Yosvany Tamayo PA        nitroglycerin (NITROSTAT) SL tablet 0.4 mg  0.4 mg Sublingual Q5 Min PRN Yosvany Tamayo PA        ondansetron ODT (ZOFRAN-ODT) disintegrating tablet 4 mg  4 mg Oral Q6H PRN Yosvany Tamayo PA        Or    ondansetron (ZOFRAN) injection 4 mg  4 mg Intravenous Q6H PRN Yosvany Tamayo PA        Phosphorus Replacement - Follow Nurse / BPA Driven Protocol   Does not apply PRN Yosvany Tamayo PA        Potassium Replacement - Follow Nurse / BPA Driven Protocol   Does not apply PRN Yosvany Tamayo PA        sodium chloride 0.9 % flush 10 mL  10 mL Intravenous PRN Yosvany Boateng MD        sodium chloride 0.9 % flush 10 mL  10 mL Intravenous  Q12H Yosvany Tamayo PA        sodium chloride 0.9 % flush 10 mL  10 mL Intravenous PRN Yosvany Tamayo PA        sodium chloride 0.9 % infusion 40 mL  40 mL Intravenous PRN Yosvany Tamayo PA        sodium chloride 0.9 % infusion  125 mL/hr Intravenous Continuous Yosvany Boateng  mL/hr at 08/30/24 0125 125 mL/hr at 08/30/24 0125     No current Epic-ordered outpatient medications on file.         PROCEDURES  Procedures            PROGRESS, DATA ANALYSIS, CONSULTS, AND MEDICAL DECISION MAKING  All labs have been independently interpreted by me.  All radiology studies have been reviewed by me. All EKG's have been independently viewed and interpreted by me.  Discussion below represents my analysis of pertinent findings related to patient's condition, differential diagnosis, treatment plan and final disposition.    Differential diagnosis:   My differential diagnosis for chest pain includes but is not limited to:  Muscle strain, costochondritis, myositis, pleurisy, rib fracture, intercostal neuritis, herpes zoster, tumor, myocardial infarction, coronary syndrome, unstable angina, angina, aortic dissection, mitral valve prolapse, pericarditis, palpitations, pulmonary embolus, pneumonia, pneumothorax, lung cancer, GERD, esophagitis, esophageal spasm      Clinical Scores: HEART Score: 0                  ED Course as of 08/30/24 0134   Fri Aug 30, 2024   0008 EKG           EKG time: 0000  Rhythm/Rate: Sinus, 85  P waves and MO: JUANI within normal limits  QRS, axis: Narrow complex  ST and T waves: No STEMI    Interpreted Contemporaneously by me, independently viewed  Comparison: Unavailable   [RS]   0101 WBC: 8.89 [RS]   0101 Hemoglobin: 14.2 [RS]   0101 Immature Grans, Absolute: 0.03 [RS]   0101 RADIOLOGY      Study: Single view chest  Findings: No pneumothorax or focal infiltrate appreciated  I independently viewed and interpreted these images contemporaneously with treatment.    [RS]   0114 HS Troponin T: 14 [RS]   0115  Magnesium(!): 2.3 [RS]   0115 TSH Baseline: 1.960 [RS]   0115 Glucose(!): 111 [RS]   0115 BUN: 17 [RS]   0115 Creatinine: 1.27 [RS]   0115 Potassium(!): 3.2 [RS]   0115 Sodium: 141 [RS]   0115 ALT (SGPT): 13 [RS]   0115 AST (SGOT): 20 [RS]   0115 Total Bilirubin: 0.5 [RS]   0133 I reviewed all findings with patient.  Recommend admission to the observation unit.  Patient agreeable. [RS]   0134 CONSULT        Provider: GEE Hair MA    Discussion: Reviewed patient history, ED presentation evaluation.  Agreeable to accept patient to ED observation unit for further evaluation and treatment.    Agreeable c treatment and planned disposition.         [RS]      ED Course User Index  [RS] Yosvany Boateng MD         Prescription drug monitoring program review:     AS OF 01:34 EDT VITALS:    BP - 134/77  HR - 74  TEMP - 99.9 °F (37.7 °C) (Tympanic)  O2 SATS - 98%    COMPLEXITY OF CARE  The patient requires admission.      DIAGNOSIS  Final diagnoses:   Chest pain in adult   Palpitations         DISPOSITION  ED Disposition       ED Disposition   Decision to Admit    Condition   --    Comment   --                  ADMISSION    Discussed treatment plan and reason for admission with pt/family and admitting physician.  Pt/family voiced understanding of the plan for admission for further testing/treatment as needed.       Please note that portions of this document were completed with a voice recognition program.    Note Disclaimer: At [facility], we believe that sharing information builds trust and better relationships. You are receiving this note because you recently visited [facility]. It is possible you will see health information before a provider has talked with you about it. This kind of information can be easy to misunderstand. To help you fully understand what it means for your health, we urge you to discuss this note with your provider.         Yosvany Boateng MD  08/30/24 0134

## 2024-08-30 NOTE — CASE MANAGEMENT/SOCIAL WORK
Case Management Discharge Note      Final Note: Home; self-care. Layla CALLAHAN         Selected Continued Care - Discharged on 8/30/2024 Admission date: 8/29/2024 - Discharge disposition: Home or Self Care      Destination    No services have been selected for the patient.                Durable Medical Equipment    No services have been selected for the patient.                Dialysis/Infusion    No services have been selected for the patient.                Home Medical Care    No services have been selected for the patient.                Therapy    No services have been selected for the patient.                Community Resources    No services have been selected for the patient.                Community & DME    No services have been selected for the patient.                         Final Discharge Disposition Code: 01 - home or self-care

## 2024-08-30 NOTE — CONSULTS
"Date of Hospital Visit: 24  Encounter Provider: Linda Cole RN  Place of Service: [facility]  Patient Name: Andres Andrade  :2005  Referral Provider: Yosvany Boateng MD    Chief complaint: tachycardia, CP, SOA    History of Present Illness     Andres Andrade is a 20yo healthy male patient who lives in [place] and is here for a [employer] tournament. He had reported a several month history of elevated heart rate and other symptoms and has an appointment with cardiologist there on .    He reports elevated heart rates with racing and workouts that seem to persist for hours after he is done.  He monitors his heart rate very closely with his watch/phone.  He reports that his heart rate has reached as high as 214 bpm.  He does not describe a \"light switch\" type of tachycardia with abrupt onset/offset, but rather a process that sounds more gradual.  It makes his chest feel uncomfortable and he feels short of breath and lightheaded.  He is very fatigued.  Otherwise he has not had any exercise intolerance.  He also reports feeling like his heart is pounding even when his heart rate is normal at rest.    Upon arrival, his potassium was noted to be mildly low at 3.2.  His D-dimer was normal, TSH within normal limits, high-sensitivity troponin 14.  His EKG showed normal sinus rhythm/sinus arrhythmia with an early repolarization pattern.  Overnight, telemetry has shown sinus arrhythmia and sinus bradycardia.    History reviewed. No pertinent past medical history.    Past Surgical History:   Procedure Laterality Date    RHINOPLASTY         Prior to Admission medications    Not on File       Social History     Socioeconomic History    Marital status: Single   Tobacco Use    Smoking status: Never   Vaping Use    Vaping status: Never Used   Substance and Sexual Activity    Alcohol use: Never    Drug use: Never       History reviewed. No pertinent family history.    Review of " "Systems   Constitutional:  Positive for fatigue.   Respiratory:  Positive for shortness of breath.    Cardiovascular:  Positive for chest pain and palpitations.   Neurological:  Positive for weakness and headaches.   All other systems reviewed and are negative.       Objective:     Vitals:    08/30/24 0246 08/30/24 0316 08/30/24 0346 08/30/24 0608   BP: 110/60 107/60 118/68    Pulse: 73 74 70 57   Resp:       Temp:       TempSrc:       SpO2: 96% 96% 95% 96%   Weight:       Height:         Body mass index is 24.55 kg/m².  Flowsheet Rows      Flowsheet Row First Filed Value   Admission Height 180.3 cm (71\") Documented at 08/29/2024 2349   Admission Weight 79.8 kg (176 lb) Documented at 08/29/2024 2349            Physical Exam  Vitals reviewed.   Constitutional:       Appearance: He is well-developed.   HENT:      Head: Normocephalic.      Nose: Nose normal.      Mouth/Throat:      Pharynx: Oropharynx is clear.   Eyes:      Conjunctiva/sclera: Conjunctivae normal.   Neck:      Vascular: No JVD.   Cardiovascular:      Rate and Rhythm: Normal rate and regular rhythm.      Pulses: Normal pulses.      Heart sounds: Normal heart sounds.   Pulmonary:      Effort: Pulmonary effort is normal.      Breath sounds: Normal breath sounds.   Abdominal:      Palpations: Abdomen is soft.      Tenderness: There is no abdominal tenderness.   Musculoskeletal:         General: No swelling. Normal range of motion.      Cervical back: Normal range of motion.   Skin:     General: Skin is warm and dry.   Neurological:      General: No focal deficit present.      Mental Status: He is alert.   Psychiatric:         Behavior: Behavior normal.         Thought Content: Thought content normal.         Judgment: Judgment normal.                 Lab Review:                Results from last 7 days   Lab Units 08/30/24  0033   SODIUM mmol/L 141   POTASSIUM mmol/L 3.2*   CHLORIDE mmol/L 106   CO2 mmol/L 23.2   BUN mg/dL 17   CREATININE mg/dL 1.27 "   GLUCOSE mg/dL 111*   CALCIUM mg/dL 9.3     Results from last 7 days   Lab Units 08/30/24  0033   CK TOTAL U/L 215*   HSTROP T ng/L 14     Results from last 7 days   Lab Units 08/30/24  0647   WBC 10*3/mm3 5.42   HEMOGLOBIN g/dL 13.5   HEMATOCRIT % 39.5   PLATELETS 10*3/mm3 173     Results from last 7 days   Lab Units 08/30/24  0033   INR  1.17*   APTT seconds 29.7         Results from last 7 days   Lab Units 08/30/24  0033   MAGNESIUM mg/dL 2.3*                       I personally viewed and interpreted the patient's EKG/Telemetry data    Assessment/Plan:     1. 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  He does not describe a typical onset/offset of something like an SVT, but a more gradual process of his heart rate coming down.  He also feels like his heart is pounding even when his heart rate is normal.    I did go ahead and put him on the treadmill today to make sure he did not have any exercise induced arrhythmias.  He completed 12 minutes of a Nikita protocol, reaching 87% of his age-predicted maximum heart rate.  The study was negative for ischemia and there were no arrhythmias noted.  His heart rate was less than 100 bpm less than 5 minutes after completing the study.  An echocardiogram was performed and was completely normal as well.  Monitoring him overnight has failed to show any rhythm disturbances.  I believe it is reasonable for him to go out with a 2-week patch monitor and to follow-up with a cardiologist in [place] with whom he has previously been scheduled.

## 2024-08-30 NOTE — DISCHARGE SUMMARY
ED OBSERVATION PROGRESS/DISCHARGE SUMMARY    Date of Admission: 8/29/2024   LOS: 0 days   PCP: Provider, No Known    Final Diagnosis chest pain with palpitations      Subjective     Hospital Outcome: Andres Andrade is a 19 y.o. male who comes in complaining of chest palpitations and chest discomfort for the past several months.  Patient states his symptoms progressively got worse over the past few weeks.  Patient describes his pain as feeling aches in the center of the chest while his heart is racing.  Patient states he will often feel lightheaded and feel nauseous along with this.  Patient denies any vomiting.  Patient believes he did have an episode where his vision went black but he states that he did not lose consciousness or suffer any falls or injuries and denies hitting his head.  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  Patient states he has not had any significant falls or injuries due to this.  Patient denies any increased or worsening anxiety or stress.  Patient does report some tingling in his hands that will come and go along with his symptoms as well.  Patient states he is pain-free currently.  Patient states he wears a watch double monitor his heart rate and states that he was working out light activity recently when his phone monitor had sensed his heart rate between 170 and 210 even after resting for several minutes.  Patient states that he will have the above symptoms of chest discomfort and lightheadedness when his heart rate remains high like this.  Patient states he had seen his primary care provider previously but only had lab work that was reportedly normal.  Patient states about 2 months ago he did have a bronchitis like illness in which patient states he had a chest x-ray and was put on antibiotics.  Patient denies any known heart history or any previous heart testing back home in [place].     In the ED, CK of 215, troponin of 14,  potassium of 3.2 otherwise unremarkable CBC and CMP for acute findings.  TSH normal.  D-dimer negative.  Chest x-ray shows trace bilateral pleural fluid, otherwise no acute findings.  EKG shows sinus rhythm 84 beats minute, early repolarization pattern versus IVCD versus pan ST elevation.  Patient is afebrile, pulse in 70s, on room air oxygen 95% SpO2 and blood pressure normotensive    Patient has had negative troponins x 3.  Echo obtained and showed normal LV systolic function with EF of 56 to 60%, normal diastolic function, and no signs of pericardial effusion.  Treadmill stress test ordered by cardiology and no arrhythmias were noted, negative clinical evidence of myocardial ischemia and findings were consistent with a normal EKG stress test.  Cardiology saw and evaluated the patient and no further inpatient testing recommended at this time from their standpoint, they recommend that the patient discharged home on a 2-week Holter monitor and he can follow-up with a cardiologist that he has been referred to when he returns home to [facility].    ROS:  General: no fevers, chills  Respiratory: no cough, dyspnea  Cardiovascular: no chest pain, palpitations  Abdomen: No abdominal pain, nausea, vomiting, or diarrhea  Neurologic: No focal weakness    Objective   Physical Exam:  I have reviewed the vital signs.  Temp:  [97.8 °F (36.6 °C)-99.9 °F (37.7 °C)] 97.8 °F (36.6 °C)  Heart Rate:  [47-92] 47  Resp:  [16-18] 16  BP: (107-137)/(60-84) 129/80  General Appearance:    Alert, cooperative, no distress, overall healthy appearing male  Head:    Normocephalic, atraumatic, normal hearing  Eyes:    Sclerae anicteric, EOMI  Neck:   Supple, nontender  Lungs: Clear to auscultation bilaterally, respirations unlabored on room air  Heart: Regular rate and rhythm, S1 and S2 normal, no murmur  Abdomen:  Soft, nontender, bowel sounds active, nondistended  Extremities: No clubbing, cyanosis, or edema to lower  extremities  Pulses:  2+ and symmetric in distal lower extremities  Skin: No rashes   Neurologic: Oriented x3, Normal strength to extremities    Results Review:    I have reviewed the labs, radiology results and diagnostic studies.    Results from last 7 days   Lab Units 08/30/24  0647   WBC 10*3/mm3 5.42   HEMOGLOBIN g/dL 13.5   HEMATOCRIT % 39.5   PLATELETS 10*3/mm3 173     Results from last 7 days   Lab Units 08/30/24  0647 08/30/24  0033   SODIUM mmol/L 140 141   POTASSIUM mmol/L 3.5 3.2*   CHLORIDE mmol/L 109* 106   CO2 mmol/L 23.1 23.2   BUN mg/dL 15 17   CREATININE mg/dL 1.08 1.27   CALCIUM mg/dL 8.5* 9.3   BILIRUBIN mg/dL  --  0.5   ALK PHOS U/L  --  76   ALT (SGPT) U/L  --  13   AST (SGOT) U/L  --  20   GLUCOSE mg/dL 90 111*     Imaging Results (Last 24 Hours)       Procedure Component Value Units Date/Time    XR Chest 1 View [[id number]] Collected: 08/30/24 0229     Updated: 08/30/24 0231    Narrative:        Patient: KELL SARAVIA  Time Out: 02:28  Exam(s): XR CXR 1 VIEW     EXAM:    XR Chest, 1 View    CLINICAL HISTORY:    Reason for exam: chest pain.    TECHNIQUE:    Frontal view of the chest.    COMPARISON:    No relevant prior studies available.    FINDINGS:    Lungs:  Hypoinflation with bronchovascular crowding.    Pleural space:  Trace bilateral pleural fluid.    Heart:  Unremarkable.    Bones joints:  No acute displaced fracture.    IMPRESSION:       1.  Trace bilateral pleural fluid.  2.  Hypoinflation with bronchovascular crowding.      Impression:          Electronically signed by Leonel Pradhan MD on 08-30-24 at 0228            I have reviewed the medications.  ---------------------------------------------------------------------------------------------  Assessment & Plan   Assessment/Problem List    Palpitations      Plan:    Chest pain, palpitations  -TSH normal.   - D-dimer neg  -Chest x-ray shows trace bilateral pleural fluid, otherwise no acute findings.    -EKG shows sinus rhythm 84 beats  minute, early repolarization pattern versus IVCD versus pan ST elevation.  -negative troponins x 3.    -Echo obtained and showed normal LV systolic function with EF of 56 to 60%, normal diastolic function, and no signs of pericardial effusion.    -Treadmill stress test ordered by cardiology and no arrhythmias were noted, negative clinical evidence of myocardial ischemia and findings were consistent with a normal EKG stress test.   -Cardiology saw and evaluated the patient and no further inpatient testing recommended at this time from their standpoint, they recommend that the patient discharged home on a 2-week Holter monitor and he can follow-up with a cardiologist that he has been referred to when he returns home to [facility].     Hypokalemia, replace  -potassium of 3.2 otherwise unremarkable CBC and CMP for acute findings.       GERD  -PPI    Disposition: Discharged to home    Follow-up after Discharge: PCP in 1 to 2 weeks, cardiology as regularly scheduled    This note will serve as a discharge summary    Bertha Paz PA-C 08/30/24 12:45 EDT    I have worn appropriate PPE during this patient encounter, sanitized my hands both with entering and exiting patient's room.      37 minutes has been spent by [facility] providers in the care of this patient while under observation status

## 2024-08-30 NOTE — PLAN OF CARE
Goal Outcome Evaluation:      Pt admitted to obs unit due to chest pain.  EKG and troponins obtained.  Cardiology consult.  Echo and stress test pending.